# Patient Record
Sex: FEMALE | Race: WHITE | NOT HISPANIC OR LATINO | Employment: OTHER | URBAN - METROPOLITAN AREA
[De-identification: names, ages, dates, MRNs, and addresses within clinical notes are randomized per-mention and may not be internally consistent; named-entity substitution may affect disease eponyms.]

---

## 2024-09-25 ENCOUNTER — OFFICE VISIT (OUTPATIENT)
Dept: INTERNAL MEDICINE | Facility: CLINIC | Age: 74
End: 2024-09-25
Payer: MEDICARE

## 2024-09-25 VITALS
SYSTOLIC BLOOD PRESSURE: 138 MMHG | TEMPERATURE: 98.4 F | BODY MASS INDEX: 27.2 KG/M2 | OXYGEN SATURATION: 99 % | HEART RATE: 83 BPM | WEIGHT: 147.8 LBS | HEIGHT: 62 IN | DIASTOLIC BLOOD PRESSURE: 80 MMHG

## 2024-09-25 DIAGNOSIS — Z12.31 ENCOUNTER FOR SCREENING MAMMOGRAM FOR MALIGNANT NEOPLASM OF BREAST: ICD-10-CM

## 2024-09-25 DIAGNOSIS — Z00.00 PREVENTATIVE HEALTH CARE: Primary | ICD-10-CM

## 2024-09-25 DIAGNOSIS — I10 PRIMARY HYPERTENSION: Chronic | ICD-10-CM

## 2024-09-25 DIAGNOSIS — F33.1 MODERATE RECURRENT MAJOR DEPRESSION: ICD-10-CM

## 2024-09-25 DIAGNOSIS — E78.2 MIXED HYPERLIPIDEMIA: ICD-10-CM

## 2024-09-25 RX ORDER — AMLODIPINE BESYLATE 10 MG/1
10 TABLET ORAL DAILY
Start: 2024-09-25

## 2024-09-25 RX ORDER — CARVEDILOL 12.5 MG/1
12.5 TABLET ORAL 2 TIMES DAILY WITH MEALS
Qty: 180 TABLET | Refills: 3 | Status: SHIPPED | OUTPATIENT
Start: 2024-09-25

## 2024-09-25 RX ORDER — AMLODIPINE BESYLATE 10 MG/1
10 TABLET ORAL DAILY
Qty: 90 TABLET | Refills: 3 | Status: SHIPPED | OUTPATIENT
Start: 2024-09-25 | End: 2024-09-25 | Stop reason: SDUPTHER

## 2024-09-27 ENCOUNTER — LAB (OUTPATIENT)
Dept: LAB | Facility: HOSPITAL | Age: 74
End: 2024-09-27
Payer: MEDICARE

## 2024-09-27 DIAGNOSIS — E55.9 VITAMIN D DEFICIENCY: ICD-10-CM

## 2024-09-27 DIAGNOSIS — E78.2 MIXED HYPERLIPIDEMIA: ICD-10-CM

## 2024-09-27 DIAGNOSIS — I10 PRIMARY HYPERTENSION: ICD-10-CM

## 2024-09-27 DIAGNOSIS — R53.83 OTHER FATIGUE: ICD-10-CM

## 2024-09-27 LAB
25(OH)D3 SERPL-MCNC: 20 NG/ML (ref 30–100)
ALBUMIN SERPL-MCNC: 4.4 G/DL (ref 3.5–5.2)
ALBUMIN/GLOB SERPL: 1.6 G/DL
ALP SERPL-CCNC: 94 U/L (ref 39–117)
ALT SERPL W P-5'-P-CCNC: 17 U/L (ref 1–33)
ANION GAP SERPL CALCULATED.3IONS-SCNC: 12 MMOL/L (ref 5–15)
AST SERPL-CCNC: 19 U/L (ref 1–32)
BASOPHILS # BLD AUTO: 0.06 10*3/MM3 (ref 0–0.2)
BASOPHILS NFR BLD AUTO: 1.2 % (ref 0–1.5)
BILIRUB SERPL-MCNC: 1.3 MG/DL (ref 0–1.2)
BUN SERPL-MCNC: 12 MG/DL (ref 8–23)
BUN/CREAT SERPL: 13.3 (ref 7–25)
CALCIUM SPEC-SCNC: 9.9 MG/DL (ref 8.6–10.5)
CHLORIDE SERPL-SCNC: 103 MMOL/L (ref 98–107)
CHOLEST SERPL-MCNC: 228 MG/DL (ref 0–200)
CO2 SERPL-SCNC: 26 MMOL/L (ref 22–29)
CREAT SERPL-MCNC: 0.9 MG/DL (ref 0.57–1)
DEPRECATED RDW RBC AUTO: 40.9 FL (ref 37–54)
EGFRCR SERPLBLD CKD-EPI 2021: 68.1 ML/MIN/1.73
EOSINOPHIL # BLD AUTO: 0.19 10*3/MM3 (ref 0–0.4)
EOSINOPHIL NFR BLD AUTO: 3.7 % (ref 0.3–6.2)
ERYTHROCYTE [DISTWIDTH] IN BLOOD BY AUTOMATED COUNT: 12.6 % (ref 12.3–15.4)
GLOBULIN UR ELPH-MCNC: 2.7 GM/DL
GLUCOSE SERPL-MCNC: 99 MG/DL (ref 65–99)
HCT VFR BLD AUTO: 39.9 % (ref 34–46.6)
HDLC SERPL-MCNC: 76 MG/DL (ref 40–60)
HGB BLD-MCNC: 13.6 G/DL (ref 12–15.9)
IMM GRANULOCYTES # BLD AUTO: 0.01 10*3/MM3 (ref 0–0.05)
IMM GRANULOCYTES NFR BLD AUTO: 0.2 % (ref 0–0.5)
LDLC SERPL CALC-MCNC: 135 MG/DL (ref 0–100)
LDLC/HDLC SERPL: 1.75 {RATIO}
LYMPHOCYTES # BLD AUTO: 1.29 10*3/MM3 (ref 0.7–3.1)
LYMPHOCYTES NFR BLD AUTO: 24.9 % (ref 19.6–45.3)
MCH RBC QN AUTO: 30.5 PG (ref 26.6–33)
MCHC RBC AUTO-ENTMCNC: 34.1 G/DL (ref 31.5–35.7)
MCV RBC AUTO: 89.5 FL (ref 79–97)
MONOCYTES # BLD AUTO: 0.46 10*3/MM3 (ref 0.1–0.9)
MONOCYTES NFR BLD AUTO: 8.9 % (ref 5–12)
NEUTROPHILS NFR BLD AUTO: 3.18 10*3/MM3 (ref 1.7–7)
NEUTROPHILS NFR BLD AUTO: 61.1 % (ref 42.7–76)
NRBC BLD AUTO-RTO: 0 /100 WBC (ref 0–0.2)
PLATELET # BLD AUTO: 255 10*3/MM3 (ref 140–450)
PMV BLD AUTO: 10.9 FL (ref 6–12)
POTASSIUM SERPL-SCNC: 3.4 MMOL/L (ref 3.5–5.2)
PROT SERPL-MCNC: 7.1 G/DL (ref 6–8.5)
RBC # BLD AUTO: 4.46 10*6/MM3 (ref 3.77–5.28)
SODIUM SERPL-SCNC: 141 MMOL/L (ref 136–145)
TRIGL SERPL-MCNC: 96 MG/DL (ref 0–150)
TSH SERPL DL<=0.05 MIU/L-ACNC: 2.41 UIU/ML (ref 0.27–4.2)
VIT B12 BLD-MCNC: 328 PG/ML (ref 211–946)
VLDLC SERPL-MCNC: 17 MG/DL (ref 5–40)
WBC NRBC COR # BLD AUTO: 5.19 10*3/MM3 (ref 3.4–10.8)

## 2024-09-27 PROCEDURE — 80053 COMPREHEN METABOLIC PANEL: CPT

## 2024-09-27 PROCEDURE — 85025 COMPLETE CBC W/AUTO DIFF WBC: CPT

## 2024-09-27 PROCEDURE — 84443 ASSAY THYROID STIM HORMONE: CPT

## 2024-09-27 PROCEDURE — 80061 LIPID PANEL: CPT

## 2024-09-27 PROCEDURE — 82607 VITAMIN B-12: CPT

## 2024-09-27 PROCEDURE — 82306 VITAMIN D 25 HYDROXY: CPT

## 2024-09-28 RX ORDER — BUPROPION HYDROCHLORIDE 300 MG/1
300 TABLET ORAL DAILY
Qty: 90 TABLET | Refills: 1 | Status: SHIPPED | OUTPATIENT
Start: 2024-09-28

## 2024-10-04 DIAGNOSIS — I16.0 HYPERTENSIVE URGENCY: ICD-10-CM

## 2024-10-04 RX ORDER — HYDROCHLOROTHIAZIDE 25 MG/1
25 TABLET ORAL DAILY
Qty: 90 TABLET | Refills: 1 | OUTPATIENT
Start: 2024-10-04

## 2024-10-04 RX ORDER — BUPROPION HYDROCHLORIDE 150 MG/1
150 TABLET ORAL DAILY
Qty: 90 TABLET | Refills: 1 | OUTPATIENT
Start: 2024-10-04

## 2025-01-16 RX ORDER — ESTRADIOL 1 MG/1
1 TABLET ORAL DAILY
Qty: 90 TABLET | Refills: 1 | Status: SHIPPED | OUTPATIENT
Start: 2025-01-16

## 2025-01-16 NOTE — TELEPHONE ENCOUNTER
Rx Refill Note  Requested Prescriptions     Pending Prescriptions Disp Refills    estradiol (ESTRACE) 1 MG tablet [Pharmacy Med Name: ESTRADIOL 1 MG TABLET] 90 tablet 1     Sig: TAKE 1 TABLET BY MOUTH DAILY      Last office visit with prescribing clinician: 9/25/2024   Last telemedicine visit with prescribing clinician: Visit date not found   Next office visit with prescribing clinician: 3/25/2025     FAILED PROTOCOL                         Would you like a call back once the refill request has been completed: [] Yes [] No    If the office needs to give you a call back, can they leave a voicemail: [] Yes [] No    Priya Renae MA  01/16/25, 09:13 EST

## 2025-03-03 NOTE — TELEPHONE ENCOUNTER
Rx Refill Note  Requested Prescriptions     Pending Prescriptions Disp Refills    promethazine (PHENERGAN) 12.5 MG tablet 10 tablet 0     Sig: Take 1 tablet by mouth Every 6 (Six) Hours As Needed.      Last office visit with prescribing clinician: 9/25/2024   Last telemedicine visit with prescribing clinician: Visit date not found   Next office visit with prescribing clinician: 3/25/2025                         Would you like a call back once the refill request has been completed: [] Yes [] No    If the office needs to give you a call back, can they leave a voicemail: [] Yes [] No    Danwa Peña MA  03/03/25, 14:20 EST

## 2025-03-03 NOTE — TELEPHONE ENCOUNTER
"Caller: Allyn Hyatt \"Jolanta\"    Relationship: Self    Best call back number: [phone]     Requested Prescriptions:   Requested Prescriptions     Pending Prescriptions Disp Refills    promethazine (PHENERGAN) 12.5 MG tablet 10 tablet 0     Sig: Take 1 tablet by mouth Every 6 (Six) Hours As Needed.        Pharmacy where request should be sent: [facility] [id number] [address] [phone] [facility] [phone] FX     Last office visit with prescribing clinician: 9/25/2024   Last telemedicine visit with prescribing clinician: Visit date not found   Next office visit with prescribing clinician: 3/25/2025     Additional details provided by patient: PATIENT HAS CALLED REQUESTING A REFILL ON ABOVE MEDICATION.     Does the patient have less than a 3 day supply:  [x] Yes  [] No    Would you like a call back once the refill request has been completed: [] Yes [x] No    If the office needs to give you a call back, can they leave a voicemail: [] Yes [x] No    Bryan Gonzalez   03/03/25 14:13 EST         "

## 2025-03-04 RX ORDER — PROMETHAZINE HYDROCHLORIDE 12.5 MG/1
12.5 TABLET ORAL EVERY 6 HOURS PRN
Qty: 10 TABLET | Refills: 0 | Status: SHIPPED | OUTPATIENT
Start: 2025-03-04

## 2025-03-25 ENCOUNTER — OFFICE VISIT (OUTPATIENT)
Dept: INTERNAL MEDICINE | Facility: CLINIC | Age: 75
End: 2025-03-25
Payer: MEDICARE

## 2025-03-25 VITALS
TEMPERATURE: 97.8 F | WEIGHT: 146.2 LBS | OXYGEN SATURATION: 96 % | HEART RATE: 76 BPM | HEIGHT: 62 IN | DIASTOLIC BLOOD PRESSURE: 84 MMHG | BODY MASS INDEX: 26.91 KG/M2 | SYSTOLIC BLOOD PRESSURE: 152 MMHG

## 2025-03-25 DIAGNOSIS — E78.2 MIXED HYPERLIPIDEMIA: ICD-10-CM

## 2025-03-25 DIAGNOSIS — E55.9 VITAMIN D DEFICIENCY: ICD-10-CM

## 2025-03-25 DIAGNOSIS — F33.1 MODERATE RECURRENT MAJOR DEPRESSION: ICD-10-CM

## 2025-03-25 DIAGNOSIS — I10 PRIMARY HYPERTENSION: Primary | Chronic | ICD-10-CM

## 2025-03-25 PROBLEM — F32.A DEPRESSIVE DISORDER: Status: RESOLVED | Noted: 2019-01-10 | Resolved: 2025-03-25

## 2025-03-25 RX ORDER — BUPROPION HYDROCHLORIDE 300 MG/1
300 TABLET ORAL DAILY
Qty: 90 TABLET | Refills: 1 | Status: SHIPPED | OUTPATIENT
Start: 2025-03-25

## 2025-03-25 RX ORDER — AMLODIPINE BESYLATE 10 MG/1
10 TABLET ORAL DAILY
Qty: 90 TABLET | Refills: 3 | Status: SHIPPED | OUTPATIENT
Start: 2025-03-25

## 2025-03-25 NOTE — PROGRESS NOTES
[facility]     Allyn Hyatt  1950   [id number]      Patient Care Team:  Chino Mayes MD as PCP - General (Internal Medicine)    Chief Complaint::   Chief Complaint   Patient presents with    Follow-up     6 month follow up        HPI  History of Present Illness  The patient is a 74-year-old female who presents for follow-up of hypertension, depression, and hyperlipidemia.    She reports feeling well overall. She has recently retired from her job, which has significantly reduced her stress levels. She is on Wellbutrin.    She requires refills for all her medications except the one for nausea, which was refilled the previous week. She is on carvedilol and amlodipine.    She has received a notification for her mammogram, which is scheduled for 04/22/2025.    MEDICATIONS  Carvedilol, amlodipine, Wellbutrin, estrogen.      Chronic Conditions:      Patient Active Problem List   Diagnosis    Primary hypertension    Moderate recurrent major depression    Syncope    N&V (nausea and vomiting)    Mixed hyperlipidemia    Vitamin D deficiency        Past Medical History:   Diagnosis Date    Cholecystitis with perforation of gallbladder     Fall     Hip pain     Hypertension        Past Surgical History:   Procedure Laterality Date    AUGMENTATION MAMMAPLASTY Bilateral     CHOLECYSTECTOMY      HYSTERECTOMY      KNEE SURGERY Right 06/10/2022    arthroscopy with parital medial and lateral menisectomy- JRT       Family History   Problem Relation Age of Onset    Breast cancer Mother 50    Ovarian cancer Mother 50    Stroke Mother     Diabetes Mother     Heart attack Mother     Hypertension Mother     Lung cancer Father     Cancer Brother         gastric    Diabetes Maternal Grandmother        Social History     Socioeconomic History    Marital status: Single   Tobacco Use    Smoking status: Never    Smokeless tobacco: Never   Vaping Use    Vaping status: Never Used   Substance and Sexual Activity  "   Alcohol use: No    Drug use: No       Allergies   Allergen Reactions    Lisinopril Cough         Current Outpatient Medications:     amLODIPine (NORVASC) 10 MG tablet, Take 1 tablet by mouth Daily., Disp: 90 tablet, Rfl: 3    aspirin 81 MG EC tablet, Take 1 tablet by mouth Daily., Disp: , Rfl:     buPROPion XL (WELLBUTRIN XL) 300 MG 24 hr tablet, Take 1 tablet by mouth Daily., Disp: 90 tablet, Rfl: 1    carvedilol (COREG) 12.5 MG tablet, Take 1 tablet by mouth 2 (Two) Times a Day With Meals., Disp: 180 tablet, Rfl: 3    cyclobenzaprine (FLEXERIL) 10 MG tablet, Take 1 tablet by mouth 2 (Two) Times a Day As Needed for Muscle Spasms., Disp: 90 tablet, Rfl: 1    estradiol (ESTRACE) 1 MG tablet, TAKE 1 TABLET BY MOUTH DAILY, Disp: 90 tablet, Rfl: 1    ondansetron (Zofran) 4 MG tablet, Take 1 tablet by mouth Every 8 (Eight) Hours As Needed for Nausea or Vomiting., Disp: 10 tablet, Rfl: 1    ondansetron ODT (ZOFRAN-ODT) 4 MG disintegrating tablet, Place 1 tablet on the tongue Every 8 (Eight) Hours As Needed for Nausea or Vomiting., Disp: 10 tablet, Rfl: 0    ibuprofen (ADVIL,MOTRIN) 200 MG tablet, Take 2 tablets by mouth 2 (Two) Times a Day., Disp: , Rfl:     Review of Systems   Constitutional: Negative.    Respiratory: Negative.  Negative for chest tightness and shortness of breath.    Cardiovascular: Negative.  Negative for chest pain.   Gastrointestinal:  Negative for abdominal pain, blood in stool, constipation and diarrhea.        Vital Signs  Vitals:    03/25/25 1456   BP: 152/84   Pulse: 76   Temp: 97.8 °F (36.6 °C)   TempSrc: Infrared   SpO2: 96%   Weight: 66.3 kg (146 lb 3.2 oz)   Height: 157.5 cm (62\")   PainSc: 5    PainLoc: Shoulder       Physical Exam  Vitals reviewed.   Constitutional:       Appearance: Normal appearance. She is well-developed.   HENT:      Head: Normocephalic and atraumatic.   Neck:      Thyroid: No thyromegaly.      Vascular: No carotid bruit.   Cardiovascular:      Rate and Rhythm: " Normal rate and regular rhythm.      Heart sounds: Normal heart sounds. No murmur heard.     No friction rub. No gallop.   Pulmonary:      Effort: Pulmonary effort is normal.      Breath sounds: Normal breath sounds.   Musculoskeletal:      Cervical back: Normal range of motion and neck supple.      Right lower leg: No edema.      Left lower leg: No edema.   Lymphadenopathy:      Cervical: No cervical adenopathy.   Skin:     General: Skin is warm and dry.   Neurological:      Mental Status: She is alert and oriented to person, place, and time.      Cranial Nerves: No cranial nerve deficit.   Psychiatric:         Mood and Affect: Mood normal.         Behavior: Behavior normal.        Physical Exam  Vital Signs  Blood pressure is 152/84.    Procedures    ACE III MINI        Results  Laboratory Studies  Cholesterol levels had increased compared to previous test. Potassium and vitamin D were slightly low.           Assessment/Plan:    Diagnoses and all orders for this visit:    1. Primary hypertension (Primary)  -     amLODIPine (NORVASC) 10 MG tablet; Take 1 tablet by mouth Daily.  Dispense: 90 tablet; Refill: 3    2. Moderate recurrent major depression    3. Mixed hyperlipidemia  -     Apolipoprotein B; Future  -     Comprehensive Metabolic Panel; Future  -     Lipid Panel; Future    4. Vitamin D deficiency  -     Vitamin D,25-Hydroxy; Future    Other orders  -     buPROPion XL (WELLBUTRIN XL) 300 MG 24 hr tablet; Take 1 tablet by mouth Daily.  Dispense: 90 tablet; Refill: 1      Assessment & Plan  1. Hypertension.  Her blood pressure has historically been very difficult to control. Today's blood pressure is acceptable at 152/84 mmHg. Continue current regimen of amlodipine and carvedilol.    2. Depression.  Her mood appears to be well compensated on bupropion. Refill for bupropion will be sent to her pharmacy.    3. Hyperlipidemia.  Lipid panel is pending. She is advised to continue a healthy diet. Blood work will be  done in the next few days to check lipid levels.    4. Vitamin D deficiency.  Vitamin D level is pending. She is advised to follow up on the results when available. Blood work will be done in the next few days to check vitamin D levels.    5. Medication management.  She needs refills for all her medications except for the nausea medication, which was refilled last week. Refills for amlodipine and Wellbutrin will be sent to her pharmacy. Carvedilol and estrogen were previously refilled and are not needed at this time.    6. Health maintenance.  She has received a notification for her mammogram, which is scheduled for 04/22/2025. Her previous lab work showed slightly low potassium and vitamin D levels. Blood work will be done in the next few days to recheck these levels.    Follow-up  She will follow up in 6 months.      Plan of care reviewed with patient at the conclusion of today's visit. Education was provided regarding diagnosis, management, and any prescribed or recommended OTC medications.Patient verbalizes understanding of and agreement with management plan.     Patient or patient representative verbalized consent for the use of Ambient Listening during the visit with  Chino Mayes MD for chart documentation. 3/29/2025  13:06 EDT        Chino Mayes MD

## 2025-04-22 ENCOUNTER — HOSPITAL ENCOUNTER (OUTPATIENT)
Dept: MAMMOGRAPHY | Facility: HOSPITAL | Age: 75
Discharge: HOME | End: 2025-04-22
Admitting: INTERNAL MEDICINE
Payer: MEDICARE

## 2025-04-22 DIAGNOSIS — Z12.31 ENCOUNTER FOR SCREENING MAMMOGRAM FOR MALIGNANT NEOPLASM OF BREAST: ICD-10-CM

## 2025-04-22 PROCEDURE — 77063 BREAST TOMOSYNTHESIS BI: CPT

## 2025-04-22 PROCEDURE — 77067 SCR MAMMO BI INCL CAD: CPT

## 2025-05-06 ENCOUNTER — HOSPITAL ENCOUNTER (OUTPATIENT)
Dept: ULTRASOUND IMAGING | Facility: HOSPITAL | Age: 75
Discharge: HOME | End: 2025-05-06
Payer: MEDICARE

## 2025-05-06 ENCOUNTER — HOSPITAL ENCOUNTER (OUTPATIENT)
Dept: MAMMOGRAPHY | Facility: HOSPITAL | Age: 75
Discharge: HOME | End: 2025-05-06
Payer: MEDICARE

## 2025-05-06 DIAGNOSIS — R92.8 ABNORMAL MAMMOGRAM: ICD-10-CM

## 2025-05-06 PROCEDURE — G0279 TOMOSYNTHESIS, MAMMO: HCPCS | Performed by: RADIOLOGY

## 2025-05-06 PROCEDURE — 77066 DX MAMMO INCL CAD BI: CPT

## 2025-05-06 PROCEDURE — 76642 ULTRASOUND BREAST LIMITED: CPT | Performed by: RADIOLOGY

## 2025-05-06 PROCEDURE — 76642 ULTRASOUND BREAST LIMITED: CPT

## 2025-05-06 PROCEDURE — 77065 DX MAMMO INCL CAD UNI: CPT

## 2025-05-06 PROCEDURE — 77065 DX MAMMO INCL CAD UNI: CPT | Performed by: RADIOLOGY

## 2025-05-06 PROCEDURE — G0279 TOMOSYNTHESIS, MAMMO: HCPCS

## 2025-05-22 ENCOUNTER — OFFICE VISIT (OUTPATIENT)
Dept: INTERNAL MEDICINE | Facility: CLINIC | Age: 75
End: 2025-05-22
Payer: MEDICARE

## 2025-05-22 VITALS
WEIGHT: 144.4 LBS | TEMPERATURE: 98.4 F | BODY MASS INDEX: 26.57 KG/M2 | HEART RATE: 82 BPM | SYSTOLIC BLOOD PRESSURE: 146 MMHG | DIASTOLIC BLOOD PRESSURE: 88 MMHG | HEIGHT: 62 IN | OXYGEN SATURATION: 99 %

## 2025-05-22 DIAGNOSIS — R05.1 ACUTE COUGH: ICD-10-CM

## 2025-05-22 DIAGNOSIS — J20.8 ACUTE BRONCHITIS DUE TO OTHER SPECIFIED ORGANISMS: Primary | ICD-10-CM

## 2025-05-22 LAB
EXPIRATION DATE: NORMAL
FLUAV AG UPPER RESP QL IA.RAPID: NOT DETECTED
FLUBV AG UPPER RESP QL IA.RAPID: NOT DETECTED
INTERNAL CONTROL: NORMAL
Lab: NORMAL
SARS-COV-2 AG UPPER RESP QL IA.RAPID: NOT DETECTED

## 2025-05-22 RX ORDER — ONDANSETRON 4 MG/1
4 TABLET, FILM COATED ORAL EVERY 8 HOURS PRN
Qty: 30 TABLET | Refills: 1 | Status: SHIPPED | OUTPATIENT
Start: 2025-05-22

## 2025-05-22 RX ORDER — TRIAMCINOLONE ACETONIDE 40 MG/ML
80 INJECTION, SUSPENSION INTRA-ARTICULAR; INTRAMUSCULAR ONCE
Status: COMPLETED | OUTPATIENT
Start: 2025-05-22 | End: 2025-05-22

## 2025-05-22 RX ORDER — AZITHROMYCIN 250 MG/1
TABLET, FILM COATED ORAL
Qty: 6 TABLET | Refills: 0 | Status: SHIPPED | OUTPATIENT
Start: 2025-05-22

## 2025-05-22 RX ADMIN — TRIAMCINOLONE ACETONIDE 80 MG: 40 INJECTION, SUSPENSION INTRA-ARTICULAR; INTRAMUSCULAR at 10:46

## 2025-05-22 NOTE — PROGRESS NOTES
[facility]     Allyn Hyatt  1950   [id number]      Patient Care Team:  Chino Mayes MD as PCP - General (Internal Medicine)    Chief Complaint::   Chief Complaint   Patient presents with    Cough    Nasal Congestion    Nausea    Fatigue        HPI  History of Present Illness  The patient presents for evaluation of cough, congestion, nausea, and fatigue.    Symptoms began on 05/15/2025, initially presenting as drainage, which she suspected to be indicative of a sinus infection. The condition progressively worsened, culminating in the onset of vomiting and diarrhea on 05/19/2025. Over the past few days, there has been a slight improvement in symptoms, although she experienced an episode of diarrhea this morning. Her appetite and fluid intake have been significantly reduced. She reports experiencing congestion and a non-productive cough. Additionally, ear popping has been noted over the past couple of days. No facial pain or tenderness behind the eyes is reported. She has been managing the cough with Theraflu, which she reports as being effective.      Chronic Conditions:      Patient Active Problem List   Diagnosis    Primary hypertension    Moderate recurrent major depression    Syncope    N&V (nausea and vomiting)    Mixed hyperlipidemia    Vitamin D deficiency        Past Medical History:   Diagnosis Date    Cholecystitis with perforation of gallbladder     Fall     Hip pain     Hypertension        Past Surgical History:   Procedure Laterality Date    AUGMENTATION MAMMAPLASTY Bilateral     CHOLECYSTECTOMY      HYSTERECTOMY      KNEE SURGERY Right 06/10/2022    arthroscopy with parital medial and lateral menisectomy- JRT       Family History   Problem Relation Age of Onset    Breast cancer Mother 50    Ovarian cancer Mother 50    Stroke Mother     Diabetes Mother     Heart attack Mother     Hypertension Mother     Lung cancer Father     Cancer Brother         gastric    Diabetes  "Maternal Grandmother        Social History     Socioeconomic History    Marital status: Single   Tobacco Use    Smoking status: Never    Smokeless tobacco: Never   Vaping Use    Vaping status: Never Used   Substance and Sexual Activity    Alcohol use: No    Drug use: No       Allergies   Allergen Reactions    Lisinopril Cough         Current Outpatient Medications:     amLODIPine (NORVASC) 10 MG tablet, Take 1 tablet by mouth Daily., Disp: 90 tablet, Rfl: 3    aspirin 81 MG EC tablet, Take 1 tablet by mouth Daily., Disp: , Rfl:     buPROPion XL (WELLBUTRIN XL) 300 MG 24 hr tablet, Take 1 tablet by mouth Daily., Disp: 90 tablet, Rfl: 1    carvedilol (COREG) 12.5 MG tablet, Take 1 tablet by mouth 2 (Two) Times a Day With Meals., Disp: 180 tablet, Rfl: 3    cyclobenzaprine (FLEXERIL) 10 MG tablet, Take 1 tablet by mouth 2 (Two) Times a Day As Needed for Muscle Spasms., Disp: 90 tablet, Rfl: 1    estradiol (ESTRACE) 1 MG tablet, TAKE 1 TABLET BY MOUTH DAILY, Disp: 90 tablet, Rfl: 1    ibuprofen (ADVIL,MOTRIN) 200 MG tablet, Take 2 tablets by mouth 2 (Two) Times a Day., Disp: , Rfl:     ondansetron (Zofran) 4 MG tablet, Take 1 tablet by mouth Every 8 (Eight) Hours As Needed for Nausea or Vomiting., Disp: 30 tablet, Rfl: 1    azithromycin (Zithromax Z-Filemon) 250 MG tablet, Take 2 tablets the first day, then 1 tablet daily for 4 days., Disp: 6 tablet, Rfl: 0    Review of Systems   Constitutional:  Negative for chills and fever.   HENT:  Positive for postnasal drip.    Respiratory:  Positive for cough.         Vital Signs  Vitals:    05/22/25 0951   BP: 146/88   BP Location: Left arm   Patient Position: Sitting   Cuff Size: Adult   Pulse: 82   Temp: 98.4 °F (36.9 °C)   TempSrc: Infrared   SpO2: 99%   Weight: 65.5 kg (144 lb 6.4 oz)   Height: 157.5 cm (62.01\")   PainSc: 0-No pain       Physical Exam  Constitutional:       Appearance: Normal appearance. She is not ill-appearing.   HENT:      Mouth/Throat:      Pharynx: " Oropharynx is clear. No posterior oropharyngeal erythema.   Cardiovascular:      Rate and Rhythm: Normal rate and regular rhythm.   Musculoskeletal:      Cervical back: Normal range of motion and neck supple.   Lymphadenopathy:      Cervical: No cervical adenopathy.   Neurological:      Mental Status: She is alert.        Physical Exam  Ears: Mild congestion noted, no infection.  Mouth/Throat: Mild congestion noted.  Respiratory: Diffuse expiratory wheezing noted, no rales or rhonchi.    Procedures    ACE III MINI        Results             Assessment/Plan:    Diagnoses and all orders for this visit:    1. Acute bronchitis due to other specified organisms (Primary)    2. Acute cough  -     POCT SARS-CoV-2 + Flu Antigen ALLYSON  -     triamcinolone acetonide (KENALOG-40) injection 80 mg    Other orders  -     azithromycin (Zithromax Z-Filemon) 250 MG tablet; Take 2 tablets the first day, then 1 tablet daily for 4 days.  Dispense: 6 tablet; Refill: 0  -     ondansetron (Zofran) 4 MG tablet; Take 1 tablet by mouth Every 8 (Eight) Hours As Needed for Nausea or Vomiting.  Dispense: 30 tablet; Refill: 1      Assessment & Plan  1. Viral syndrome with cough, bronchitis, nausea, and vomiting.  - Symptoms began with drainage and progressed to vomiting and diarrhea. Congestion and non-productive cough are present.  - Examination revealed diffuse expiratory wheezing without rales or rhonchi.  - Discussed the viral nature of the illness and potential progression to sinus infection or bronchitis. Reviewed the patient's use of flu treatment for cough, which has been helpful.  - Administered parenteral steroids for wheezing. Azithromycin prescribed due to the severity of wheezing. Zofran given for nausea.    Follow-up  The patient will follow up as previously scheduled.      Plan of care reviewed with patient at the conclusion of today's visit. Education was provided regarding diagnosis, management, and any prescribed or recommended OTC  medications.Patient verbalizes understanding of and agreement with management plan.     Patient or patient representative verbalized consent for the use of Ambient Listening during the visit with  Chino Mayes MD for chart documentation. 5/25/2025  17:10 EDT        Chino Mayes MD

## 2025-05-27 ENCOUNTER — TELEPHONE (OUTPATIENT)
Dept: INTERNAL MEDICINE | Facility: CLINIC | Age: 75
End: 2025-05-27
Payer: MEDICARE

## 2025-05-27 RX ORDER — METHYLPREDNISOLONE 4 MG/1
TABLET ORAL
Qty: 21 TABLET | Refills: 0 | Status: SHIPPED | OUTPATIENT
Start: 2025-05-27

## 2025-05-27 RX ORDER — DOXYCYCLINE 100 MG/1
100 CAPSULE ORAL 2 TIMES DAILY
Qty: 14 CAPSULE | Refills: 0 | Status: SHIPPED | OUTPATIENT
Start: 2025-05-27

## 2025-05-27 NOTE — TELEPHONE ENCOUNTER
"Caller: Allyn Hyatt \"Jolanta\"     Relationship: SELF    Best call back number: [phone]     What is your medical concern? PATIENT STATES SHE FINISHED THE Z-PACK BUT STILL HAS A COUGH AND CONGESTION. PATIENT IS WONDERING WHAT DOES THE DOCTOR RECOMMENDS SHE DO NEXT ? PATIENT WOULD LIKE A CALL BACK     "

## 2025-06-09 ENCOUNTER — TELEPHONE (OUTPATIENT)
Dept: INTERNAL MEDICINE | Facility: CLINIC | Age: 75
End: 2025-06-09
Payer: MEDICARE

## 2025-06-09 ENCOUNTER — TELEPHONE (OUTPATIENT)
Dept: INTERNAL MEDICINE | Facility: CLINIC | Age: 75
End: 2025-06-09

## 2025-06-09 DIAGNOSIS — R05.2 SUBACUTE COUGH: Primary | ICD-10-CM

## 2025-06-09 NOTE — TELEPHONE ENCOUNTER
"    Caller: Allyn Hyatt \"Jolanta\"    Relationship: Self    Best call back number:  [phone]    What orders are you requesting (i.e. lab or imaging): CHEST X-RAY    In what timeframe would the patient need to come in: ASAP    Where will you receive your lab/imaging services: DR LEE DECIDE, PREFERABLY AT [facility]    Additional notes: PATIENT IS REQUESTING AN X-RAY OF HER CHEST BECAUSE SHE IS NOT GETTING BETTER WITH HER WHEEZING AND COUGHING SHE HAS BEEN TREATED FOR WITH TWO ROUNDS OF ANTIBIOTICS AND PREDNISONE. PLEASE CALL WITH ANY QUESTIONS OR WHEN COMPLETED.        "

## 2025-06-09 NOTE — TELEPHONE ENCOUNTER
"    Caller: Allyn Hyatt \"Jolanta\"    Relationship: Self    Best call back number: [phone]     What medication are you requesting: PATIENT NEEDS SOMETHING FOR HER CHEST CONGESTION    What are your current symptoms: WHEEZING, COUGH    How long have you been experiencing symptoms: SINCE 5/22/25      If a prescription is needed, what is your preferred pharmacy and phone number:    [facility] [id number] [facility] [address] [phone]  - [phone]     Additional notes:PATIENT HAS BEEN ON TWO ROUNDS OF ANTIBIOTICS PLUS PREDNISONE FOR 7 DAYS, BUT IT HAS GOTTEN WORSE. PLEASE CALL AND ADVISE          "

## 2025-06-09 NOTE — TELEPHONE ENCOUNTER
"    Caller: Allyn Hyatt \"Jolanta\"    Relationship: Self    Best call back number: [phone]     What orders are you requesting (i.e. lab or imaging): PATIENT WANTS TO KNOW IF ANY TEST CAN BE ADDED TO HER BLOOD WORK ORDERS TO CONFIRM BRONCHITIS OR WHAT HER PROBLEM MAY BE WITH HER SEVERE COUGHING    In what timeframe would the patient need to come in: ASAP    Where will you receive your lab/imaging services: DR TO DECIDE    Additional notes: PLEASE CALL WITH ANY QUESTIONS OR WHEN COMPLETED.        "

## 2025-06-09 NOTE — TELEPHONE ENCOUNTER
"    Caller: Allyn Hyatt \"Jolanta\"    Relationship: Self    Best call back number: [phone]     What orders are you requesting (i.e. lab or imaging): MRI    In what timeframe would the patient need to come in: ASAP    Where will you receive your lab/imaging services: DR LEE DECIDE    Additional notes: WHEN PATIENT HAD THEIR MAMMOGRAM, THEY RECOMMENDED SHE RECEIVE A MRI. PATIENT STATES THEY WERE GOING TO SEND PCP A MESSAGE. PLEASE CALL WITH ANY QUESTIONS OR WHEN COMPLETED.        "

## 2025-06-09 NOTE — TELEPHONE ENCOUNTER
There is 4 messages on this patient today.  I have ordered x-ray.  For anything else she would need to be evaluated in the office.

## 2025-06-09 NOTE — TELEPHONE ENCOUNTER
I have ordered a chest x-ray.  If he develop continues and does not get better I recommend appointment in the office.

## 2025-06-09 NOTE — TELEPHONE ENCOUNTER
I have ordered chest x-ray, for more orders she would need to come in for a visit.  As a first step I recommend chest x-ray

## 2025-06-10 ENCOUNTER — LAB (OUTPATIENT)
Dept: LAB | Facility: HOSPITAL | Age: 75
End: 2025-06-10
Payer: MEDICARE

## 2025-06-10 ENCOUNTER — HOSPITAL ENCOUNTER (OUTPATIENT)
Dept: GENERAL RADIOLOGY | Facility: HOSPITAL | Age: 75
Discharge: HOME | End: 2025-06-10
Payer: MEDICARE

## 2025-06-10 DIAGNOSIS — E78.2 MIXED HYPERLIPIDEMIA: ICD-10-CM

## 2025-06-10 DIAGNOSIS — E55.9 VITAMIN D DEFICIENCY: ICD-10-CM

## 2025-06-10 DIAGNOSIS — R05.2 SUBACUTE COUGH: ICD-10-CM

## 2025-06-10 LAB
25(OH)D3 SERPL-MCNC: 17.9 NG/ML (ref 30–100)
ALBUMIN SERPL-MCNC: 4.4 G/DL (ref 3.5–5.2)
ALBUMIN/GLOB SERPL: 1.5 G/DL
ALP SERPL-CCNC: 107 U/L (ref 39–117)
ALT SERPL W P-5'-P-CCNC: 26 U/L (ref 1–33)
ANION GAP SERPL CALCULATED.3IONS-SCNC: 9.4 MMOL/L (ref 5–15)
AST SERPL-CCNC: 20 U/L (ref 1–32)
BILIRUB SERPL-MCNC: 1 MG/DL (ref 0–1.2)
BUN SERPL-MCNC: 15 MG/DL (ref 8–23)
BUN/CREAT SERPL: 17.6 (ref 7–25)
CALCIUM SPEC-SCNC: 10.1 MG/DL (ref 8.6–10.5)
CHLORIDE SERPL-SCNC: 103 MMOL/L (ref 98–107)
CHOLEST SERPL-MCNC: 234 MG/DL (ref 0–200)
CO2 SERPL-SCNC: 25.6 MMOL/L (ref 22–29)
CREAT SERPL-MCNC: 0.85 MG/DL (ref 0.57–1)
EGFRCR SERPLBLD CKD-EPI 2021: 72.9 ML/MIN/1.73
GLOBULIN UR ELPH-MCNC: 3 GM/DL
GLUCOSE SERPL-MCNC: 85 MG/DL (ref 65–99)
HDLC SERPL-MCNC: 87 MG/DL (ref 40–60)
LDLC SERPL CALC-MCNC: 124 MG/DL (ref 0–100)
LDLC/HDLC SERPL: 1.38 {RATIO}
POTASSIUM SERPL-SCNC: 3.8 MMOL/L (ref 3.5–5.2)
PROT SERPL-MCNC: 7.4 G/DL (ref 6–8.5)
SODIUM SERPL-SCNC: 138 MMOL/L (ref 136–145)
TRIGL SERPL-MCNC: 136 MG/DL (ref 0–150)
VLDLC SERPL-MCNC: 23 MG/DL (ref 5–40)

## 2025-06-10 PROCEDURE — 80061 LIPID PANEL: CPT

## 2025-06-10 PROCEDURE — 36415 COLL VENOUS BLD VENIPUNCTURE: CPT

## 2025-06-10 PROCEDURE — 80053 COMPREHEN METABOLIC PANEL: CPT

## 2025-06-10 PROCEDURE — 82306 VITAMIN D 25 HYDROXY: CPT

## 2025-06-10 PROCEDURE — 82172 ASSAY OF APOLIPOPROTEIN: CPT

## 2025-06-10 PROCEDURE — 71046 X-RAY EXAM CHEST 2 VIEWS: CPT

## 2025-06-13 LAB — APO B SERPL-MCNC: 88 MG/DL

## 2025-07-07 ENCOUNTER — TELEPHONE (OUTPATIENT)
Dept: INTERNAL MEDICINE | Facility: CLINIC | Age: 75
End: 2025-07-07
Payer: MEDICARE

## 2025-07-07 DIAGNOSIS — T85.44XA CAPSULAR CONTRACTURE OF BREAST IMPLANT, INITIAL ENCOUNTER: Primary | ICD-10-CM

## 2025-07-07 NOTE — TELEPHONE ENCOUNTER
PLEASE ROUTE TO DR BARKER.  SP/W PT.  ADVISED PT THAT DR BARKER IS OOO AND WILL NOT RETURN TIL 07/08.  PT VERBALIZED AND UNDERSTOOD.

## 2025-07-24 ENCOUNTER — HOSPITAL ENCOUNTER (OUTPATIENT)
Dept: MRI IMAGING | Facility: HOSPITAL | Age: 75
Discharge: HOME | End: 2025-07-24
Admitting: INTERNAL MEDICINE
Payer: MEDICARE

## 2025-07-24 DIAGNOSIS — T85.44XA CAPSULAR CONTRACTURE OF BREAST IMPLANT, INITIAL ENCOUNTER: ICD-10-CM

## 2025-07-24 PROCEDURE — 77047 MRI BREAST C- BILATERAL: CPT

## 2025-07-31 ENCOUNTER — TELEPHONE (OUTPATIENT)
Dept: INTERNAL MEDICINE | Facility: CLINIC | Age: 75
End: 2025-07-31

## 2025-07-31 DIAGNOSIS — T85.43XA RUPTURE OF IMPLANT OF LEFT BREAST, INITIAL ENCOUNTER: Primary | ICD-10-CM

## 2025-07-31 DIAGNOSIS — T85.43XA RUPTURE OF IMPLANT OF RIGHT BREAST, INITIAL ENCOUNTER: ICD-10-CM
